# Patient Record
Sex: FEMALE | Race: WHITE | NOT HISPANIC OR LATINO | Employment: FULL TIME | URBAN - METROPOLITAN AREA
[De-identification: names, ages, dates, MRNs, and addresses within clinical notes are randomized per-mention and may not be internally consistent; named-entity substitution may affect disease eponyms.]

---

## 2018-03-30 LAB
EXTERNAL GTT 3 HOURS: NORMAL
EXTERNAL HEPATITIS B SURFACE ANTIGEN: NEGATIVE
EXTERNAL HEPATITIS C AB: NORMAL
EXTERNAL RUBELLA QUALITATIVE: NORMAL
EXTERNAL SYPHILIS RPR SCREEN: NORMAL

## 2018-08-14 ENCOUNTER — TRANSCRIBE ORDERS (OUTPATIENT)
Dept: DIABETES SERVICES | Facility: HOSPITAL | Age: 33
End: 2018-08-14

## 2018-08-14 DIAGNOSIS — O24.410 DIET CONTROLLED GESTATIONAL DIABETES MELLITUS (GDM) IN THIRD TRIMESTER: Primary | ICD-10-CM

## 2018-08-16 ENCOUNTER — HOSPITAL ENCOUNTER (OUTPATIENT)
Dept: DIABETES SERVICES | Facility: HOSPITAL | Age: 33
Discharge: HOME | End: 2018-08-16
Admitting: NURSE PRACTITIONER

## 2018-08-16 PROCEDURE — G0108 DIAB MANAGE TRN  PER INDIV: HCPCS

## 2018-09-27 LAB — EXTERNAL GROUP B STREP ANTIGEN: NORMAL

## 2018-10-11 ENCOUNTER — HOSPITAL ENCOUNTER (INPATIENT)
Facility: HOSPITAL | Age: 33
LOS: 3 days | Discharge: HOME | End: 2018-10-14
Attending: OBSTETRICS & GYNECOLOGY | Admitting: OBSTETRICS & GYNECOLOGY

## 2018-10-11 PROBLEM — O16.3 ELEVATED BLOOD PRESSURE COMPLICATING PREGNANCY IN THIRD TRIMESTER, ANTEPARTUM: Status: ACTIVE | Noted: 2018-10-11

## 2018-10-11 PROBLEM — O16.3 ELEVATED BLOOD PRESSURE AFFECTING PREGNANCY IN THIRD TRIMESTER, ANTEPARTUM: Status: ACTIVE | Noted: 2018-10-11

## 2018-10-11 PROBLEM — Z34.90 TERM PREGNANCY: Status: ACTIVE | Noted: 2018-10-11

## 2018-10-11 LAB
ABO GROUP BLD: NORMAL
ALBUMIN SERPL-MCNC: 2.9 G/DL (ref 3.5–5.2)
ALBUMIN/GLOB SERPL: 0.9 G/DL
ALP SERPL-CCNC: 142 U/L (ref 39–117)
ALT SERPL W P-5'-P-CCNC: 13 U/L (ref 1–33)
ANION GAP SERPL CALCULATED.3IONS-SCNC: 11.8 MMOL/L
AST SERPL-CCNC: 18 U/L (ref 1–32)
BASOPHILS # BLD AUTO: 0.03 10*3/MM3 (ref 0–0.2)
BASOPHILS NFR BLD AUTO: 0.3 % (ref 0–1.5)
BILIRUB SERPL-MCNC: 0.3 MG/DL (ref 0.1–1.2)
BLD GP AB SCN SERPL QL: NEGATIVE
BUN BLD-MCNC: 14 MG/DL (ref 6–20)
BUN/CREAT SERPL: 17.1 (ref 7–25)
CALCIUM SPEC-SCNC: 9.1 MG/DL (ref 8.6–10.5)
CHLORIDE SERPL-SCNC: 100 MMOL/L (ref 98–107)
CO2 SERPL-SCNC: 20.2 MMOL/L (ref 22–29)
CREAT BLD-MCNC: 0.82 MG/DL (ref 0.57–1)
DEPRECATED RDW RBC AUTO: 47.2 FL (ref 37–54)
EOSINOPHIL # BLD AUTO: 0.06 10*3/MM3 (ref 0–0.7)
EOSINOPHIL NFR BLD AUTO: 0.6 % (ref 0.3–6.2)
ERYTHROCYTE [DISTWIDTH] IN BLOOD BY AUTOMATED COUNT: 14.8 % (ref 11.7–13)
EXPIRATION DATE: ABNORMAL
GFR SERPL CREATININE-BSD FRML MDRD: 81 ML/MIN/1.73
GLOBULIN UR ELPH-MCNC: 3.3 GM/DL
GLUCOSE BLD-MCNC: 71 MG/DL (ref 65–99)
GLUCOSE BLDC GLUCOMTR-MCNC: 109 MG/DL (ref 70–130)
GLUCOSE BLDC GLUCOMTR-MCNC: 82 MG/DL (ref 70–130)
GLUCOSE BLDC GLUCOMTR-MCNC: 82 MG/DL (ref 70–130)
GLUCOSE BLDC GLUCOMTR-MCNC: 98 MG/DL (ref 70–130)
HCT VFR BLD AUTO: 36.4 % (ref 35.6–45.5)
HGB BLD-MCNC: 11 G/DL (ref 11.9–15.5)
IMM GRANULOCYTES # BLD: 0.06 10*3/MM3 (ref 0–0.03)
IMM GRANULOCYTES NFR BLD: 0.6 % (ref 0–0.5)
LYMPHOCYTES # BLD AUTO: 2.26 10*3/MM3 (ref 0.9–4.8)
LYMPHOCYTES NFR BLD AUTO: 24 % (ref 19.6–45.3)
Lab: ABNORMAL
MCH RBC QN AUTO: 26.5 PG (ref 26.9–32)
MCHC RBC AUTO-ENTMCNC: 30.2 G/DL (ref 32.4–36.3)
MCV RBC AUTO: 87.7 FL (ref 80.5–98.2)
MONOCYTES # BLD AUTO: 0.51 10*3/MM3 (ref 0.2–1.2)
MONOCYTES NFR BLD AUTO: 5.4 % (ref 5–12)
NEUTROPHILS # BLD AUTO: 6.5 10*3/MM3 (ref 1.9–8.1)
NEUTROPHILS NFR BLD AUTO: 69.1 % (ref 42.7–76)
PLATELET # BLD AUTO: 189 10*3/MM3 (ref 140–500)
PMV BLD AUTO: 12.6 FL (ref 6–12)
POTASSIUM BLD-SCNC: 4.2 MMOL/L (ref 3.5–5.2)
PROT SERPL-MCNC: 6.2 G/DL (ref 6–8.5)
PROT UR STRIP-MCNC: ABNORMAL MG/DL
RBC # BLD AUTO: 4.15 10*6/MM3 (ref 3.9–5.2)
RH BLD: POSITIVE
SODIUM BLD-SCNC: 132 MMOL/L (ref 136–145)
T&S EXPIRATION DATE: NORMAL
WBC NRBC COR # BLD: 9.42 10*3/MM3 (ref 4.5–10.7)

## 2018-10-11 PROCEDURE — 85025 COMPLETE CBC W/AUTO DIFF WBC: CPT | Performed by: OBSTETRICS & GYNECOLOGY

## 2018-10-11 PROCEDURE — 63710000001 INSULIN DETEMIR PER 5 UNITS: Performed by: OBSTETRICS & GYNECOLOGY

## 2018-10-11 PROCEDURE — 59025 FETAL NON-STRESS TEST: CPT

## 2018-10-11 PROCEDURE — 86900 BLOOD TYPING SEROLOGIC ABO: CPT | Performed by: OBSTETRICS & GYNECOLOGY

## 2018-10-11 PROCEDURE — 80053 COMPREHEN METABOLIC PANEL: CPT | Performed by: OBSTETRICS & GYNECOLOGY

## 2018-10-11 PROCEDURE — 82962 GLUCOSE BLOOD TEST: CPT

## 2018-10-11 PROCEDURE — 86850 RBC ANTIBODY SCREEN: CPT | Performed by: OBSTETRICS & GYNECOLOGY

## 2018-10-11 PROCEDURE — 86901 BLOOD TYPING SEROLOGIC RH(D): CPT | Performed by: OBSTETRICS & GYNECOLOGY

## 2018-10-11 PROCEDURE — 81002 URINALYSIS NONAUTO W/O SCOPE: CPT | Performed by: OBSTETRICS & GYNECOLOGY

## 2018-10-11 PROCEDURE — 59025 FETAL NON-STRESS TEST: CPT | Performed by: OBSTETRICS & GYNECOLOGY

## 2018-10-11 RX ORDER — SODIUM CHLORIDE 0.9 % (FLUSH) 0.9 %
3-10 SYRINGE (ML) INJECTION AS NEEDED
Status: DISCONTINUED | OUTPATIENT
Start: 2018-10-11 | End: 2018-10-12 | Stop reason: HOSPADM

## 2018-10-11 RX ORDER — ZOLPIDEM TARTRATE 5 MG/1
5 TABLET ORAL NIGHTLY PRN
Status: DISCONTINUED | OUTPATIENT
Start: 2018-10-11 | End: 2018-10-12 | Stop reason: HOSPADM

## 2018-10-11 RX ORDER — PRENATAL VIT NO.126/IRON/FOLIC 28MG-0.8MG
1 TABLET ORAL DAILY
COMMUNITY

## 2018-10-11 RX ORDER — ACETAMINOPHEN 325 MG/1
650 TABLET ORAL EVERY 4 HOURS PRN
Status: DISCONTINUED | OUTPATIENT
Start: 2018-10-11 | End: 2018-10-12 | Stop reason: HOSPADM

## 2018-10-11 RX ORDER — ACETAMINOPHEN 325 MG/1
650 TABLET ORAL ONCE
Status: COMPLETED | OUTPATIENT
Start: 2018-10-11 | End: 2018-10-11

## 2018-10-11 RX ORDER — LIDOCAINE HYDROCHLORIDE 10 MG/ML
5 INJECTION, SOLUTION EPIDURAL; INFILTRATION; INTRACAUDAL; PERINEURAL AS NEEDED
Status: DISCONTINUED | OUTPATIENT
Start: 2018-10-11 | End: 2018-10-12 | Stop reason: HOSPADM

## 2018-10-11 RX ORDER — ACETAMINOPHEN 500 MG
1000 TABLET ORAL EVERY 6 HOURS PRN
COMMUNITY

## 2018-10-11 RX ORDER — PSEUDOEPHEDRINE HCL 30 MG
60 TABLET ORAL EVERY 4 HOURS PRN
COMMUNITY

## 2018-10-11 RX ORDER — SODIUM CHLORIDE 0.9 % (FLUSH) 0.9 %
3 SYRINGE (ML) INJECTION EVERY 12 HOURS SCHEDULED
Status: DISCONTINUED | OUTPATIENT
Start: 2018-10-11 | End: 2018-10-12 | Stop reason: HOSPADM

## 2018-10-11 RX ADMIN — Medication 3 ML: at 21:22

## 2018-10-11 RX ADMIN — ACETAMINOPHEN 650 MG: 325 TABLET, FILM COATED ORAL at 23:32

## 2018-10-11 RX ADMIN — INSULIN DETEMIR 15 UNITS: 100 INJECTION, SOLUTION SUBCUTANEOUS at 21:39

## 2018-10-11 RX ADMIN — ACETAMINOPHEN 650 MG: 325 TABLET, FILM COATED ORAL at 13:47

## 2018-10-11 NOTE — PLAN OF CARE
Problem: Patient Care Overview  Goal: Plan of Care Review  Outcome: Ongoing (interventions implemented as appropriate)   10/11/18 1845   Coping/Psychosocial   Plan of Care Reviewed With patient   Plan of Care Review   Progress improving     Goal: Individualization and Mutuality  Outcome: Ongoing (interventions implemented as appropriate)   10/11/18 1845   Individualization   Patient Specific Interventions modified bedrest, vitals every 4 hours, scds       Problem: Prenatal Patient, Hospitalized (Adult,Obstetrics,Pediatric)  Goal: Signs and Symptoms of Listed Potential Problems Will be Absent, Minimized or Managed (Prenatal Patient, Hospitalized)  Outcome: Ongoing (interventions implemented as appropriate)   10/11/18 1845   Goal/Outcome Evaluation   Problems Assessed (Prenatal Patient) all   Problems Present (Prenatal Patient) none

## 2018-10-11 NOTE — NURSING NOTE
Dr. Hernandez updated on pt arrival, b/ps, and CBC. Will notify MD when CMP is resulted and urine is dipped.

## 2018-10-11 NOTE — H&P
[facility]  Obstetric History and Physical    Patient Name: Tita Mcghee  :  1985  MRN:  [id number]        Chief Complaint   Patient presents with   • Elevated Blood Pressure      at 37wks arrives from OB office d/t elevated b/ps with h/a, occasional visual changes, and neck pain. Denies N/V and epigastric pain. Pt reports +FM and denies LOF, VB, ctx. Abdomen palpates soft.    • Headache     Pt h/a for 1 week. Describes pressure behind eyes with pain score 4/10. Severity comes and goes. Worse at night.       Subjective     Patient is a 33 y.o. female  currently at 37w0d, who presents from office for evaluation of elevated BP. Seen in office for routine visit - BP mildly elevated @ 136/96 but had 4+ protein. Has had a pressure HA for past week - feels like sinus pain. Has not taken anything to relieve. No scotomata.           Her prenatal care is significant for  diabetes  GDM A2, GBS POS, marginal cord insertion. Hx PTD(12 yrs ago).        Past Medical History: Past Medical History:   Diagnosis Date   • Depression     wellbutrin-stopped taking before pregnancy   • Gestational diabetes    • Migraine     was on topamax, stopped taking with pregnancy   • Ovarian cyst     previously, before pregnancy; resolved on own      Past Surgical History History reviewed. No pertinent surgical history.   Family History: History reviewed. No pertinent family history.   Social History:  reports that she quit smoking about 18 months ago. Her smoking use included Cigarettes. She has a 5.00 pack-year smoking history. She has never used smokeless tobacco.   reports that she does not drink alcohol.   reports that she does not use drugs.    Allergies:     Erythromycin     Past OB History:       Obstetric History       T1      L2     SAB0   TAB0   Ectopic0   Molar0   Multiple0   Live Births2       # Outcome Date GA Lbr Lloyd/2nd Weight Sex Delivery Anes PTL Lv   3 Current            2   04/25/06 35w0d  3629 g (8 lb) F Vag-Spont Spinal  [facility]   1 Term 09/05/04 40w0d  3685 g (8 lb 2 oz) F Vag-Spont EPI  [facility]            Objective       Vital Signs Range for the last 24 hours  Temperature: Temp:  [98.1 °F (36.7 °C)] 98.1 °F (36.7 °C)   Temp Source: Temp src: Oral   BP: BP: (117-160)/(71-98) 130/77   Pulse: Heart Rate:  [77-98] 90   Respirations: Resp:  [16-18] 18             Physical Exam:        General :   Alert and cooperative in NAD   Lungs:   Clear to auscultation bilaterally   CV:   Regular rate and rhythm   Abdomen:  Gravid, non-tender, no masses, no RUQ or epigastric tenderness   Vaginal exam: 1-2/50/-2     LE:   1+ edema, 2+ DTRs      FHR:   120's moderate variability - cat I   [place]:   Irregular contractions          Results from last 7 days  Lab Units 10/11/18  1150   WBC 10*3/mm3 9.42   HEMOGLOBIN g/dL 11.0*   HEMATOCRIT % 36.4   PLATELETS 10*3/mm3 189       Results from last 7 days  Lab Units 10/11/18  1150   SODIUM mmol/L 132*   POTASSIUM mmol/L 4.2   CHLORIDE mmol/L 100   CO2 mmol/L 20.2*   BUN mg/dL 14   CREATININE mg/dL 0.82   CALCIUM mg/dL 9.1   BILIRUBIN mg/dL 0.3   ALK PHOS U/L 142*   ALT (SGPT) U/L 13   AST (SGOT) U/L 18   GLUCOSE mg/dL 71     Urine dip 2+.         A/P:  1. 37 0/7 with elevated BP and proteinuria. BP much better here on unit on bedrest - no severe range BP noted. Minimal symptom - HA improved with tylenol and all labs normal. Recommend proceed with 24 urine total protein and if has preeclamptic range proteinuria then proceed with delivery. If any signs of severe disease - worsening HA or visual changes then also proceed with delivery.     2. GDMA2 - BS have been well controlled.         Sushil Hernnadez MD  10/11/2018  4:46 PM

## 2018-10-12 ENCOUNTER — ANESTHESIA EVENT (OUTPATIENT)
Dept: LABOR AND DELIVERY | Facility: HOSPITAL | Age: 33
End: 2018-10-12

## 2018-10-12 ENCOUNTER — ANESTHESIA (OUTPATIENT)
Dept: LABOR AND DELIVERY | Facility: HOSPITAL | Age: 33
End: 2018-10-12

## 2018-10-12 PROBLEM — Z34.90 TERM PREGNANCY: Status: RESOLVED | Noted: 2018-10-11 | Resolved: 2018-10-12

## 2018-10-12 LAB
ATMOSPHERIC PRESS: 746.7 MMHG
BASE EXCESS BLDCOV CALC-SCNC: -5.9 MMOL/L (ref -30–30)
BDY SITE: ABNORMAL
GAS FLOW AIRWAY: 10 LPM
GLUCOSE BLDC GLUCOMTR-MCNC: 67 MG/DL (ref 70–130)
GLUCOSE BLDC GLUCOMTR-MCNC: 69 MG/DL (ref 70–130)
GLUCOSE BLDC GLUCOMTR-MCNC: 82 MG/DL (ref 70–130)
HCO3 BLDCOV-SCNC: 20.2 MMOL/L
MODALITY: ABNORMAL
PCO2 BLDCOV: 40.9 MM HG (ref 35–51.3)
PH BLDCOV: 7.3 PH UNITS (ref 7.26–7.4)
PO2 BLDCOV: 34.6 MM HG (ref 19–39)
SAO2 % BLDCOA: 60.5 % (ref 92–99)
SAO2 % BLDCOV: ABNORMAL %

## 2018-10-12 PROCEDURE — 82803 BLOOD GASES ANY COMBINATION: CPT

## 2018-10-12 PROCEDURE — 82962 GLUCOSE BLOOD TEST: CPT

## 2018-10-12 PROCEDURE — 0KQM0ZZ REPAIR PERINEUM MUSCLE, OPEN APPROACH: ICD-10-PCS | Performed by: OBSTETRICS & GYNECOLOGY

## 2018-10-12 PROCEDURE — 25010000002 PENICILLIN G POTASSIUM PER 600000 UNITS: Performed by: OBSTETRICS & GYNECOLOGY

## 2018-10-12 PROCEDURE — 59025 FETAL NON-STRESS TEST: CPT

## 2018-10-12 PROCEDURE — C1755 CATHETER, INTRASPINAL: HCPCS | Performed by: ANESTHESIOLOGY

## 2018-10-12 PROCEDURE — 3E033VJ INTRODUCTION OF OTHER HORMONE INTO PERIPHERAL VEIN, PERCUTANEOUS APPROACH: ICD-10-PCS | Performed by: OBSTETRICS & GYNECOLOGY

## 2018-10-12 RX ORDER — ONDANSETRON 2 MG/ML
4 INJECTION INTRAMUSCULAR; INTRAVENOUS ONCE AS NEEDED
Status: DISCONTINUED | OUTPATIENT
Start: 2018-10-12 | End: 2018-10-12 | Stop reason: HOSPADM

## 2018-10-12 RX ORDER — PROMETHAZINE HYDROCHLORIDE 25 MG/1
25 TABLET ORAL EVERY 6 HOURS PRN
Status: DISCONTINUED | OUTPATIENT
Start: 2018-10-12 | End: 2018-10-14 | Stop reason: HOSPADM

## 2018-10-12 RX ORDER — OXYCODONE HYDROCHLORIDE AND ACETAMINOPHEN 5; 325 MG/1; MG/1
2 TABLET ORAL EVERY 4 HOURS PRN
Status: DISCONTINUED | OUTPATIENT
Start: 2018-10-12 | End: 2018-10-14 | Stop reason: HOSPADM

## 2018-10-12 RX ORDER — ACETAMINOPHEN 325 MG/1
650 TABLET ORAL EVERY 4 HOURS PRN
Status: DISCONTINUED | OUTPATIENT
Start: 2018-10-12 | End: 2018-10-14 | Stop reason: HOSPADM

## 2018-10-12 RX ORDER — OXYCODONE HYDROCHLORIDE AND ACETAMINOPHEN 5; 325 MG/1; MG/1
1 TABLET ORAL EVERY 4 HOURS PRN
Status: DISCONTINUED | OUTPATIENT
Start: 2018-10-12 | End: 2018-10-14 | Stop reason: HOSPADM

## 2018-10-12 RX ORDER — ONDANSETRON 4 MG/1
4 TABLET, FILM COATED ORAL EVERY 6 HOURS PRN
Status: DISCONTINUED | OUTPATIENT
Start: 2018-10-12 | End: 2018-10-12 | Stop reason: HOSPADM

## 2018-10-12 RX ORDER — METHYLERGONOVINE MALEATE 0.2 MG/ML
200 INJECTION INTRAVENOUS ONCE AS NEEDED
Status: DISCONTINUED | OUTPATIENT
Start: 2018-10-12 | End: 2018-10-12 | Stop reason: HOSPADM

## 2018-10-12 RX ORDER — OXYTOCIN-SODIUM CHLORIDE 0.9% IV SOLN 30 UNIT/500ML 30-0.9/5 UT/ML-%
125 SOLUTION INTRAVENOUS CONTINUOUS PRN
Status: COMPLETED | OUTPATIENT
Start: 2018-10-12 | End: 2018-10-12

## 2018-10-12 RX ORDER — ONDANSETRON 4 MG/1
4 TABLET, ORALLY DISINTEGRATING ORAL EVERY 6 HOURS PRN
Status: DISCONTINUED | OUTPATIENT
Start: 2018-10-12 | End: 2018-10-12 | Stop reason: HOSPADM

## 2018-10-12 RX ORDER — MISOPROSTOL 200 UG/1
800 TABLET ORAL AS NEEDED
Status: DISCONTINUED | OUTPATIENT
Start: 2018-10-12 | End: 2018-10-12 | Stop reason: HOSPADM

## 2018-10-12 RX ORDER — PENICILLIN G 3000000 [IU]/50ML
3 INJECTION, SOLUTION INTRAVENOUS EVERY 4 HOURS
Status: DISCONTINUED | OUTPATIENT
Start: 2018-10-12 | End: 2018-10-12 | Stop reason: HOSPADM

## 2018-10-12 RX ORDER — PHYTONADIONE 1 MG/.5ML
INJECTION, EMULSION INTRAMUSCULAR; INTRAVENOUS; SUBCUTANEOUS
Status: DISPENSED
Start: 2018-10-12 | End: 2018-10-13

## 2018-10-12 RX ORDER — CARBOPROST TROMETHAMINE 250 UG/ML
250 INJECTION, SOLUTION INTRAMUSCULAR AS NEEDED
Status: DISCONTINUED | OUTPATIENT
Start: 2018-10-12 | End: 2018-10-12 | Stop reason: HOSPADM

## 2018-10-12 RX ORDER — MISOPROSTOL 200 UG/1
600 TABLET ORAL ONCE AS NEEDED
Status: DISCONTINUED | OUTPATIENT
Start: 2018-10-12 | End: 2018-10-14 | Stop reason: HOSPADM

## 2018-10-12 RX ORDER — DOCUSATE SODIUM 100 MG/1
100 CAPSULE, LIQUID FILLED ORAL 2 TIMES DAILY
Status: DISCONTINUED | OUTPATIENT
Start: 2018-10-12 | End: 2018-10-14 | Stop reason: HOSPADM

## 2018-10-12 RX ORDER — LIDOCAINE HYDROCHLORIDE 15 MG/ML
INJECTION, SOLUTION EPIDURAL; INFILTRATION; INTRACAUDAL; PERINEURAL AS NEEDED
Status: DISCONTINUED | OUTPATIENT
Start: 2018-10-12 | End: 2018-10-12 | Stop reason: SURG

## 2018-10-12 RX ORDER — ONDANSETRON 2 MG/ML
4 INJECTION INTRAMUSCULAR; INTRAVENOUS EVERY 6 HOURS PRN
Status: DISCONTINUED | OUTPATIENT
Start: 2018-10-12 | End: 2018-10-12 | Stop reason: HOSPADM

## 2018-10-12 RX ORDER — FAMOTIDINE 10 MG/ML
20 INJECTION, SOLUTION INTRAVENOUS ONCE AS NEEDED
Status: DISCONTINUED | OUTPATIENT
Start: 2018-10-12 | End: 2018-10-12 | Stop reason: HOSPADM

## 2018-10-12 RX ORDER — EPHEDRINE SULFATE 50 MG/ML
5 INJECTION, SOLUTION INTRAVENOUS AS NEEDED
Status: DISCONTINUED | OUTPATIENT
Start: 2018-10-12 | End: 2018-10-12 | Stop reason: HOSPADM

## 2018-10-12 RX ORDER — LIDOCAINE HYDROCHLORIDE AND EPINEPHRINE 15; 5 MG/ML; UG/ML
INJECTION, SOLUTION EPIDURAL AS NEEDED
Status: DISCONTINUED | OUTPATIENT
Start: 2018-10-12 | End: 2018-10-12 | Stop reason: SURG

## 2018-10-12 RX ORDER — SODIUM CHLORIDE, SODIUM LACTATE, POTASSIUM CHLORIDE, CALCIUM CHLORIDE 600; 310; 30; 20 MG/100ML; MG/100ML; MG/100ML; MG/100ML
125 INJECTION, SOLUTION INTRAVENOUS CONTINUOUS
Status: DISCONTINUED | OUTPATIENT
Start: 2018-10-12 | End: 2018-10-13

## 2018-10-12 RX ORDER — LABETALOL HYDROCHLORIDE 5 MG/ML
10 INJECTION, SOLUTION INTRAVENOUS ONCE
Status: DISCONTINUED | OUTPATIENT
Start: 2018-10-12 | End: 2018-10-12

## 2018-10-12 RX ORDER — PROMETHAZINE HYDROCHLORIDE 12.5 MG/1
12.5 SUPPOSITORY RECTAL EVERY 6 HOURS PRN
Status: DISCONTINUED | OUTPATIENT
Start: 2018-10-12 | End: 2018-10-14 | Stop reason: HOSPADM

## 2018-10-12 RX ORDER — DIPHENHYDRAMINE HYDROCHLORIDE 50 MG/ML
12.5 INJECTION INTRAMUSCULAR; INTRAVENOUS EVERY 8 HOURS PRN
Status: DISCONTINUED | OUTPATIENT
Start: 2018-10-12 | End: 2018-10-12 | Stop reason: HOSPADM

## 2018-10-12 RX ORDER — NIFEDIPINE 30 MG/1
30 TABLET, EXTENDED RELEASE ORAL
Status: DISCONTINUED | OUTPATIENT
Start: 2018-10-12 | End: 2018-10-14 | Stop reason: HOSPADM

## 2018-10-12 RX ORDER — OXYTOCIN-SODIUM CHLORIDE 0.9% IV SOLN 30 UNIT/500ML 30-0.9/5 UT/ML-%
2-30 SOLUTION INTRAVENOUS
Status: DISCONTINUED | OUTPATIENT
Start: 2018-10-12 | End: 2018-10-12 | Stop reason: HOSPADM

## 2018-10-12 RX ORDER — PROMETHAZINE HYDROCHLORIDE 25 MG/ML
12.5 INJECTION, SOLUTION INTRAMUSCULAR; INTRAVENOUS EVERY 6 HOURS PRN
Status: DISCONTINUED | OUTPATIENT
Start: 2018-10-12 | End: 2018-10-14 | Stop reason: HOSPADM

## 2018-10-12 RX ORDER — OXYTOCIN-SODIUM CHLORIDE 0.9% IV SOLN 30 UNIT/500ML 30-0.9/5 UT/ML-%
250 SOLUTION INTRAVENOUS CONTINUOUS
Status: ACTIVE | OUTPATIENT
Start: 2018-10-12 | End: 2018-10-12

## 2018-10-12 RX ORDER — ONDANSETRON 4 MG/1
4 TABLET, ORALLY DISINTEGRATING ORAL EVERY 6 HOURS PRN
Status: DISCONTINUED | OUTPATIENT
Start: 2018-10-12 | End: 2018-10-14 | Stop reason: HOSPADM

## 2018-10-12 RX ORDER — PROMETHAZINE HYDROCHLORIDE 25 MG/ML
12.5 INJECTION, SOLUTION INTRAMUSCULAR; INTRAVENOUS EVERY 6 HOURS PRN
Status: DISCONTINUED | OUTPATIENT
Start: 2018-10-12 | End: 2018-10-12 | Stop reason: HOSPADM

## 2018-10-12 RX ORDER — OXYTOCIN-SODIUM CHLORIDE 0.9% IV SOLN 30 UNIT/500ML 30-0.9/5 UT/ML-%
999 SOLUTION INTRAVENOUS ONCE
Status: DISCONTINUED | OUTPATIENT
Start: 2018-10-12 | End: 2018-10-12 | Stop reason: HOSPADM

## 2018-10-12 RX ORDER — IBUPROFEN 800 MG/1
800 TABLET ORAL EVERY 8 HOURS PRN
Status: DISCONTINUED | OUTPATIENT
Start: 2018-10-12 | End: 2018-10-14 | Stop reason: HOSPADM

## 2018-10-12 RX ORDER — PROMETHAZINE HYDROCHLORIDE 12.5 MG/1
12.5 SUPPOSITORY RECTAL EVERY 6 HOURS PRN
Status: DISCONTINUED | OUTPATIENT
Start: 2018-10-12 | End: 2018-10-12 | Stop reason: HOSPADM

## 2018-10-12 RX ORDER — ONDANSETRON 2 MG/ML
4 INJECTION INTRAMUSCULAR; INTRAVENOUS EVERY 6 HOURS PRN
Status: DISCONTINUED | OUTPATIENT
Start: 2018-10-12 | End: 2018-10-14 | Stop reason: HOSPADM

## 2018-10-12 RX ORDER — PROMETHAZINE HYDROCHLORIDE 25 MG/1
12.5 TABLET ORAL EVERY 6 HOURS PRN
Status: DISCONTINUED | OUTPATIENT
Start: 2018-10-12 | End: 2018-10-12 | Stop reason: HOSPADM

## 2018-10-12 RX ORDER — ONDANSETRON 4 MG/1
4 TABLET, FILM COATED ORAL EVERY 6 HOURS PRN
Status: DISCONTINUED | OUTPATIENT
Start: 2018-10-12 | End: 2018-10-14 | Stop reason: HOSPADM

## 2018-10-12 RX ORDER — ERYTHROMYCIN 5 MG/G
OINTMENT OPHTHALMIC
Status: DISPENSED
Start: 2018-10-12 | End: 2018-10-13

## 2018-10-12 RX ADMIN — ACETAMINOPHEN 650 MG: 325 TABLET, FILM COATED ORAL at 14:13

## 2018-10-12 RX ADMIN — DOCUSATE SODIUM 100 MG: 100 CAPSULE, LIQUID FILLED ORAL at 21:41

## 2018-10-12 RX ADMIN — EPHEDRINE SULFATE 5 MG: 50 INJECTION, SOLUTION INTRAVENOUS at 16:32

## 2018-10-12 RX ADMIN — NIFEDIPINE 30 MG: 30 TABLET, FILM COATED, EXTENDED RELEASE ORAL at 23:31

## 2018-10-12 RX ADMIN — Medication: at 21:41

## 2018-10-12 RX ADMIN — SODIUM CHLORIDE, POTASSIUM CHLORIDE, SODIUM LACTATE AND CALCIUM CHLORIDE 125 ML/HR: 600; 310; 30; 20 INJECTION, SOLUTION INTRAVENOUS at 11:15

## 2018-10-12 RX ADMIN — OXYTOCIN 2 MILLI-UNITS/MIN: 10 INJECTION, SOLUTION INTRAMUSCULAR; INTRAVENOUS at 11:30

## 2018-10-12 RX ADMIN — OXYCODONE HYDROCHLORIDE AND ACETAMINOPHEN 1 TABLET: 5; 325 TABLET ORAL at 22:50

## 2018-10-12 RX ADMIN — IBUPROFEN 800 MG: 800 TABLET ORAL at 21:41

## 2018-10-12 RX ADMIN — LIDOCAINE HYDROCHLORIDE 3 ML: 15 INJECTION, SOLUTION EPIDURAL; INFILTRATION; INTRACAUDAL; PERINEURAL at 15:26

## 2018-10-12 RX ADMIN — LIDOCAINE HYDROCHLORIDE AND EPINEPHRINE 3 ML: 15; 5 INJECTION, SOLUTION EPIDURAL at 15:22

## 2018-10-12 RX ADMIN — Medication 10 ML/HR: at 15:37

## 2018-10-12 RX ADMIN — OXYTOCIN 125 ML/HR: 10 INJECTION, SOLUTION INTRAMUSCULAR; INTRAVENOUS at 20:18

## 2018-10-12 RX ADMIN — LIDOCAINE HYDROCHLORIDE 3 ML: 15 INJECTION, SOLUTION EPIDURAL; INFILTRATION; INTRACAUDAL; PERINEURAL at 15:30

## 2018-10-12 RX ADMIN — PENICILLIN G 3 MILLION UNITS: 3000000 INJECTION, SOLUTION INTRAVENOUS at 15:49

## 2018-10-12 RX ADMIN — SODIUM CHLORIDE 5 MILLION UNITS: 900 INJECTION INTRAVENOUS at 11:26

## 2018-10-12 RX ADMIN — LIDOCAINE HYDROCHLORIDE 3 ML: 15 INJECTION, SOLUTION EPIDURAL; INFILTRATION; INTRACAUDAL; PERINEURAL at 15:34

## 2018-10-12 RX ADMIN — SODIUM CHLORIDE, POTASSIUM CHLORIDE, SODIUM LACTATE AND CALCIUM CHLORIDE 125 ML/HR: 600; 310; 30; 20 INJECTION, SOLUTION INTRAVENOUS at 15:39

## 2018-10-12 NOTE — PLAN OF CARE
Problem: Patient Care Overview  Goal: Plan of Care Review  Outcome: Ongoing (interventions implemented as appropriate)   10/12/18 1351   Coping/Psychosocial   Plan of Care Reviewed With patient;spouse   Plan of Care Review   Progress improving   OTHER   Outcome Summary Pt tolerating labor well at this time     Goal: Individualization and Mutuality  Outcome: Ongoing (interventions implemented as appropriate)   10/12/18 1351   Individualization   Patient Specific Preferences epidural in labor, kangaroo care   Patient Specific Goals (Include Timeframe) vaginal delivery, pain control    Patient Specific Interventions assist with position changes, call for epidural     Goal: Discharge Needs Assessment  Outcome: Ongoing (interventions implemented as appropriate)   10/12/18 1351   Discharge Needs Assessment   Readmission Within the Last 30 Days no previous admission in last 30 days   Concerns to be Addressed no discharge needs identified   Patient/Family Anticipates Transition to home   Patient/Family Anticipated Services at Transition none   Transportation Concerns car, none   Transportation Anticipated agency   Anticipated Changes Related to Illness none   Equipment Needed After Discharge none   Disability   Equipment Currently Used at Home none       Problem: Prenatal Patient, Hospitalized (Adult,Obstetrics,Pediatric)  Goal: Signs and Symptoms of Listed Potential Problems Will be Absent, Minimized or Managed (Prenatal Patient, Hospitalized)  Outcome: Ongoing (interventions implemented as appropriate)      Problem: Hypertensive Disorders in Pregnancy (Adult,Obstetrics,Pediatric)  Goal: Signs and Symptoms of Listed Potential Problems Will be Absent, Minimized or Managed (Hypertensive Disorders in Pregnancy)  Outcome: Ongoing (interventions implemented as appropriate)   10/12/18 1351   Goal/Outcome Evaluation   Problems Assessed (Hypertensive Disorders in Pregnancy) all   Problems Present (Hypertensive/in PG) none

## 2018-10-12 NOTE — NON STRESS TEST
Tita Mcghee, a  at 37w1d with an NAOMI of 2018, by Patient Reported, was seen at [facility] LABOR DELIVERY for a nonstress test.    Chief Complaint   Patient presents with   • Elevated Blood Pressure      at 37wks arrives from OB office d/t elevated b/ps with h/a, occasional visual changes, and neck pain. Denies N/V and epigastric pain. Pt reports +FM and denies LOF, VB, ctx. Abdomen palpates soft.    • Headache     Pt h/a for 1 week. Describes pressure behind eyes with pain score 4/10. Severity comes and goes. Worse at night.       Patient Active Problem List   Diagnosis   • Term pregnancy   • Elevated blood pressure affecting pregnancy in third trimester, antepartum   • Elevated blood pressure complicating pregnancy in third trimester, antepartum       Start Time: 843  Stop Time: 943    Interpretation A  Nonstress Test Interpretation A: Reactive (10/12/18 0943 : Leanna Cuevas RN)

## 2018-10-12 NOTE — PLAN OF CARE
Problem: Patient Care Overview  Goal: Plan of Care Review  Outcome: Ongoing (interventions implemented as appropriate)   10/12/18 0610   Coping/Psychosocial   Plan of Care Reviewed With patient;spouse   Plan of Care Review   Progress improving   OTHER   Outcome Summary RNST, pt. denies any vaginal bleeding, LOF, or contractions, labile b/p's, insulin for GDM     Goal: Individualization and Mutuality  Outcome: Ongoing (interventions implemented as appropriate)   10/12/18 0610   Individualization   Patient Specific Interventions vitals every 4 hours, insulin to regulate blood sugar, 24 hour urine in progress     Goal: Interprofessional Rounds/Family Conf  Outcome: Ongoing (interventions implemented as appropriate)   10/12/18 0610   Interdisciplinary Rounds/Family Conf   Participants nursing;patient;physician       Problem: Prenatal Patient, Hospitalized (Adult,Obstetrics,Pediatric)  Goal: Signs and Symptoms of Listed Potential Problems Will be Absent, Minimized or Managed (Prenatal Patient, Hospitalized)  Outcome: Ongoing (interventions implemented as appropriate)   10/12/18 0610   Goal/Outcome Evaluation   Problems Assessed (Prenatal Patient) all   Problems Present (Prenatal Patient) none       Problem: Hypertensive Disorders in Pregnancy (Adult,Obstetrics,Pediatric)  Goal: Signs and Symptoms of Listed Potential Problems Will be Absent, Minimized or Managed (Hypertensive Disorders in Pregnancy)  Outcome: Ongoing (interventions implemented as appropriate)   10/12/18 0610   Goal/Outcome Evaluation   Problems Assessed (Hypertensive Disorders in Pregnancy) all   Problems Present (Hypertensive/in PG) none

## 2018-10-12 NOTE — NURSING NOTE
At bedside to hang amnioinfusion due to recurrent variable decels. Amnioinfusion not runing, so IUPC flushed with 10 ml saline flush and then another 10 ml saline flush. Fluid running slowly to gravity after that.

## 2018-10-12 NOTE — ANESTHESIA PREPROCEDURE EVALUATION
Anesthesia Evaluation     Patient summary reviewed and Nursing notes reviewed                Airway   Mallampati: II  TM distance: >3 FB  Neck ROM: full  no difficulty expected  Dental - normal exam     Pulmonary - negative pulmonary ROS and normal exam   Cardiovascular - negative cardio ROS and normal exam        Neuro/Psych- negative ROS  GI/Hepatic/Renal/Endo    (+)   diabetes mellitus,     Musculoskeletal (-) negative ROS    Abdominal  - normal exam   Substance History - negative use     OB/GYN negative ob/gyn ROS         Other                        Anesthesia Plan    ASA 2     epidural     intravenous induction   Anesthetic plan, all risks, benefits, and alternatives have been provided, discussed and informed consent has been obtained with: patient.    Plan discussed with CRNA.

## 2018-10-12 NOTE — PROGRESS NOTES
[facility]  Obstetric Progress Note    Patient Name: Tita Mcghee  :  1985  MRN:  [id number]  Hospital Day: 1  EGA: 37w1d      Subjective     Called due to increasing BP this AM.  Pt actually woke suddenly during night with HTN and had BP get more normal after that.  Verbal this morning with /120, 160/111, 166/107.    Objective     VS:  Vital Signs Range for the last 24 hours  Temperature: Temp:  [97.4 °F (36.3 °C)-98.3 °F (36.8 °C)] 97.4 °F (36.3 °C)   Temp Source: Temp src: Oral   BP: BP: (117-160)/() 128/82   Pulse: Heart Rate:  [] 92   Respirations: Resp:  [16-18] 18                   Physical Examination:     General :  Alert in NAD  Abdomen: Gravid, Fundus - soft       Lab results reviewed:  CBC:   Lab Results   Component Value Date    WBC 9.42 10/11/2018    HGB 11.0 (L) 10/11/2018    HCT 36.4 10/11/2018            A/P:      Elevated blood pressure affecting pregnancy in third trimester, antepartum    Term pregnancy    Elevated blood pressure complicating pregnancy in third trimester, antepartum    Will proceed with induction today due to increase in BP.  Pt understands and agrees.          Ruthie Dowling MD  10/12/2018  9:39 AM

## 2018-10-12 NOTE — NON STRESS TEST
Tita Mcghee, a  at 37w0d with an NAOMI of 2018, by Patient Reported, was seen at [facility] ANTEPARTUM UNIT for a nonstress test.    Chief Complaint   Patient presents with   • Elevated Blood Pressure      at 37wks arrives from OB office d/t elevated b/ps with h/a, occasional visual changes, and neck pain. Denies N/V and epigastric pain. Pt reports +FM and denies LOF, VB, ctx. Abdomen palpates soft.    • Headache     Pt h/a for 1 week. Describes pressure behind eyes with pain score 4/10. Severity comes and goes. Worse at night.       Patient Active Problem List   Diagnosis   • Term pregnancy   • Elevated blood pressure affecting pregnancy in third trimester, antepartum   • Elevated blood pressure complicating pregnancy in third trimester, antepartum       Start Time:   Stop Time:      reactive

## 2018-10-12 NOTE — L&D DELIVERY NOTE
"[facility]  Vaginal Delivery Note    Patient Name: Tita Mcghee  :  1985  MRN:  [id number]      Delivery     Delivery: Vaginal, Spontaneous Delivery     YOB: 2016    Time of Birth: 6:52 PM      Anesthesia: Epidural     Delivering clinician: Ruthie Dowling MD       Infant    Findings: Viable female  infant    Infant observations: Weight: 3380 g (7 lb 7.2 oz)   Observations/Comments:  Scale 3      Apgars: 7   @ 1 minute /    9   @ 5 minutes     Placenta, Cord, and Fluid    Placenta delivered  Expressed   at  10/12/2018  6:55 PM     Cord: 3 vessels  present.   Cord blood obtained: Yes    Cord gases obtained:  Yes      Repair    Episiotomy: No   Lacerations: Second midline   Estimated Blood Loss: 250  mls.       Delivery narrative: The patient is a 33 y.o.  at 37w1d.  Presented from office for evaluation of hypertension.  Initially blood pressure was better, but over the night they increased, so we opted for induction.  Was given pitocin and epidural. Progressed normally to C/C/+1.  Fetal status reassuring throughout. There were some early decels as she became complete.  of viable female infant  @ 6:52 PM  over an intact perineum. \"Turtle sign\" was encountered when head delivered.  Pt was delivered with exaggerated McRobert's maneuver, head lowered and right shoulder rotated anteriorly. 3380 g (7 lb 7.2 oz) .  Placenta delivered manually due to cord breaking, intact with 3 vessel cord. Cervix and rectum intact. Second degree laceration repaired in usual fashion with 3-0 monocryl suture. Mother and baby recovering good condition.       Ruthie Dowling MD  10/12/18  7:26 PM      "

## 2018-10-12 NOTE — ANESTHESIA PROCEDURE NOTES
Labor Epidural    Pre-sedation assessment completed: 10/12/2018 3:07 PM    Patient reassessed immediately prior to procedure    Patient location during procedure: OB  Start Time: 10/12/2018 3:07 PM  Stop Time: 10/12/2018 3:39 PM  Indication:at surgeon's request  Performed By  Anesthesiologist: VIJAY WOMACK  Preanesthetic Checklist  Completed: patient identified, site marked, surgical consent, pre-op evaluation, timeout performed, IV checked, risks and benefits discussed and monitors and equipment checked  Prep:  Pt Position:sitting  Sterile Tech:cap, gloves, gown, mask and sterile barrier  Prep:chlorhexidine gluconate and isopropyl alcohol  Monitoring:blood pressure monitoring and EKG  Epidural Block Procedure:  Approach:midline  Guidance:landmark technique and palpation technique  Location:L4-L5  Needle Type:Tuohy  Needle Gauge:17  Aspiration:negative  Test Dose:negative  Post Assessment:  Dressing:occlusive dressing applied and secured with tape  Pt Tolerance:patient tolerated the procedure well with no apparent complications  Complications:no

## 2018-10-13 LAB
BASOPHILS # BLD AUTO: 0.03 10*3/MM3 (ref 0–0.2)
BASOPHILS NFR BLD AUTO: 0.3 % (ref 0–1.5)
DEPRECATED RDW RBC AUTO: 47.6 FL (ref 37–54)
EOSINOPHIL # BLD AUTO: 0.1 10*3/MM3 (ref 0–0.7)
EOSINOPHIL NFR BLD AUTO: 0.9 % (ref 0.3–6.2)
ERYTHROCYTE [DISTWIDTH] IN BLOOD BY AUTOMATED COUNT: 14.9 % (ref 11.7–13)
GLUCOSE BLDC GLUCOMTR-MCNC: 75 MG/DL (ref 70–130)
GLUCOSE BLDC GLUCOMTR-MCNC: 89 MG/DL (ref 70–130)
HCT VFR BLD AUTO: 36.5 % (ref 35.6–45.5)
HGB BLD-MCNC: 11.4 G/DL (ref 11.9–15.5)
IMM GRANULOCYTES # BLD: 0.05 10*3/MM3 (ref 0–0.03)
IMM GRANULOCYTES NFR BLD: 0.4 % (ref 0–0.5)
LYMPHOCYTES # BLD AUTO: 2.55 10*3/MM3 (ref 0.9–4.8)
LYMPHOCYTES NFR BLD AUTO: 22.5 % (ref 19.6–45.3)
MCH RBC QN AUTO: 27.3 PG (ref 26.9–32)
MCHC RBC AUTO-ENTMCNC: 31.2 G/DL (ref 32.4–36.3)
MCV RBC AUTO: 87.5 FL (ref 80.5–98.2)
MONOCYTES # BLD AUTO: 0.79 10*3/MM3 (ref 0.2–1.2)
MONOCYTES NFR BLD AUTO: 7 % (ref 5–12)
NEUTROPHILS # BLD AUTO: 7.81 10*3/MM3 (ref 1.9–8.1)
NEUTROPHILS NFR BLD AUTO: 68.9 % (ref 42.7–76)
PLATELET # BLD AUTO: 164 10*3/MM3 (ref 140–500)
PMV BLD AUTO: 12.7 FL (ref 6–12)
RBC # BLD AUTO: 4.17 10*6/MM3 (ref 3.9–5.2)
WBC NRBC COR # BLD: 11.33 10*3/MM3 (ref 4.5–10.7)

## 2018-10-13 PROCEDURE — 85025 COMPLETE CBC W/AUTO DIFF WBC: CPT | Performed by: OBSTETRICS & GYNECOLOGY

## 2018-10-13 PROCEDURE — 82962 GLUCOSE BLOOD TEST: CPT

## 2018-10-13 RX ADMIN — OXYCODONE HYDROCHLORIDE AND ACETAMINOPHEN 1 TABLET: 5; 325 TABLET ORAL at 17:30

## 2018-10-13 RX ADMIN — NIFEDIPINE 30 MG: 30 TABLET, FILM COATED, EXTENDED RELEASE ORAL at 23:53

## 2018-10-13 RX ADMIN — IBUPROFEN 800 MG: 800 TABLET ORAL at 05:49

## 2018-10-13 RX ADMIN — OXYCODONE HYDROCHLORIDE AND ACETAMINOPHEN 1 TABLET: 5; 325 TABLET ORAL at 08:48

## 2018-10-13 RX ADMIN — OXYCODONE HYDROCHLORIDE AND ACETAMINOPHEN 1 TABLET: 5; 325 TABLET ORAL at 21:46

## 2018-10-13 RX ADMIN — IBUPROFEN 800 MG: 800 TABLET ORAL at 14:13

## 2018-10-13 RX ADMIN — DOCUSATE SODIUM 100 MG: 100 CAPSULE, LIQUID FILLED ORAL at 21:07

## 2018-10-13 RX ADMIN — DOCUSATE SODIUM 100 MG: 100 CAPSULE, LIQUID FILLED ORAL at 08:48

## 2018-10-13 RX ADMIN — IBUPROFEN 800 MG: 800 TABLET ORAL at 21:46

## 2018-10-13 NOTE — PLAN OF CARE
Problem: Patient Care Overview  Goal: Plan of Care Review  Outcome: Ongoing (interventions implemented as appropriate)   10/12/18 2006   Coping/Psychosocial   Plan of Care Reviewed With patient;spouse   Plan of Care Review   Progress improving   OTHER   Outcome Summary Pt delivered in labor and delivery and recovered. Transfer of care to post partum     Goal: Individualization and Mutuality   10/12/18 1351   Individualization   Patient Specific Preferences epidural in labor, kangaroo care   Patient Specific Goals (Include Timeframe) vaginal delivery, pain control    Patient Specific Interventions assist with position changes, call for epidural     Goal: Discharge Needs Assessment  Outcome: Ongoing (interventions implemented as appropriate)   10/12/18 2006   Discharge Needs Assessment   Readmission Within the Last 30 Days no previous admission in last 30 days   Concerns to be Addressed no discharge needs identified   Patient/Family Anticipates Transition to home   Patient/Family Anticipated Services at Transition none     Goal: Interprofessional Rounds/Family Conf  Outcome: Ongoing (interventions implemented as appropriate)   10/12/18 2006   Interdisciplinary Rounds/Family Conf   Participants patient;nursing;family;physician       Problem: Hypertensive Disorders in Pregnancy (Adult,Obstetrics,Pediatric)  Goal: Signs and Symptoms of Listed Potential Problems Will be Absent, Minimized or Managed (Hypertensive Disorders in Pregnancy)  Outcome: Ongoing (interventions implemented as appropriate)   10/12/18 2006   Goal/Outcome Evaluation   Problems Assessed (Hypertensive Disorders in Pregnancy) all   Problems Present (Hypertensive/in PG) none       Problem: Fall Risk,  (Adult,Obstetrics,Pediatric)  Goal: Identify Related Risk Factors and Signs and Symptoms  Outcome: Ongoing (interventions implemented as appropriate)   10/12/18 2006   Fall Risk,  (Adult,Obstetrics,Pediatric)   Related Risk Factors (Fall  Risk, ) regional anesthesia;prolonged bed rest   Signs and Symptoms (Fall Risk, ) presence of fall risk factors     Goal: Absence of Maternal Fall  Outcome: Ongoing (interventions implemented as appropriate)   10/12/18 2006   Fall Risk,  (Adult,Obstetrics,Pediatric)   Absence of Maternal Fall achieves outcome     Goal: Absence of [place] Fall/Drop  Outcome: Ongoing (interventions implemented as appropriate)   10/12/18 2006   Fall Risk,  (Adult,Obstetrics,Pediatric)   Absence of  Fall/Drop achieves outcome       Problem: Skin Injury Risk (Adult)  Goal: Identify Related Risk Factors and Signs and Symptoms  Outcome: Ongoing (interventions implemented as appropriate)   10/12/18 2006   Skin Injury Risk (Adult)   Related Risk Factors (Skin Injury Risk) mobility impaired  (regional anesthesia )     Goal: Skin Health and Integrity  Outcome: Ongoing (interventions implemented as appropriate)   10/12/18 2006   Skin Injury Risk (Adult)   Skin Health and Integrity achieves outcome

## 2018-10-13 NOTE — PROGRESS NOTES
[facility]  Vaginal Delivery Progress Note    Patient Name: Tita Mcghee  :  1985  MRN:  [id number]      Subjective   Postpartum Day 1: Vaginal Delivery of a female infant.     The patient feels well without complaints.  Her pain is well controlled.  Reports normal lochia.     The patient plans to breastfeed.    Objective     Vital Signs Range for the last 24 hours  Temperature: Temp:  [97 °F (36.1 °C)-98.3 °F (36.8 °C)] 97.7 °F (36.5 °C)       BP: BP: (0-167)/(0-111) 130/86   Pulse: Heart Rate:  [] 84   Respirations: Resp:  [16-18] 16                       Physical Exam:  General: Awake and alert  Abdomen: Fundus: firm, non tender  Extremities:  trace edema, NT     Labs:     Results from last 7 days  Lab Units 10/13/18  0522 10/11/18  1150   WBC 10*3/mm3 11.33* 9.42   HEMOGLOBIN g/dL 11.4* 11.0*   HEMATOCRIT % 36.5 36.4   PLATELETS 10*3/mm3 164 189         Rh Status:    RH type   Date Value Ref Range Status   10/11/2018 Positive  Final         Assessment/Plan  : 1. PPD1 S/P  - Doing well, continue usual care.   2. PIH - BP still labile. Started Procardia XL 30    Mg last PM. Cont to obs.  3. GDM A2. BS normal.            Elevated blood pressure affecting pregnancy in third trimester, antepartum    Elevated blood pressure complicating pregnancy in third trimester, antepartum          Daisy Jean MD  10/13/2018  11:04 AM

## 2018-10-14 VITALS
WEIGHT: 239 LBS | RESPIRATION RATE: 16 BRPM | TEMPERATURE: 98 F | HEIGHT: 67 IN | HEART RATE: 80 BPM | DIASTOLIC BLOOD PRESSURE: 77 MMHG | BODY MASS INDEX: 37.51 KG/M2 | OXYGEN SATURATION: 99 % | SYSTOLIC BLOOD PRESSURE: 115 MMHG

## 2018-10-14 PROBLEM — O16.3 ELEVATED BLOOD PRESSURE COMPLICATING PREGNANCY IN THIRD TRIMESTER, ANTEPARTUM: Status: RESOLVED | Noted: 2018-10-11 | Resolved: 2018-10-14

## 2018-10-14 PROBLEM — O16.3 ELEVATED BLOOD PRESSURE AFFECTING PREGNANCY IN THIRD TRIMESTER, ANTEPARTUM: Status: RESOLVED | Noted: 2018-10-11 | Resolved: 2018-10-14

## 2018-10-14 RX ORDER — NIFEDIPINE 30 MG/1
30 TABLET, FILM COATED, EXTENDED RELEASE ORAL
Qty: 30 TABLET | Refills: 1 | Status: SHIPPED | OUTPATIENT
Start: 2018-10-14

## 2018-10-14 RX ORDER — IBUPROFEN 800 MG/1
800 TABLET ORAL EVERY 8 HOURS PRN
Qty: 50 TABLET | Refills: 3 | Status: SHIPPED | OUTPATIENT
Start: 2018-10-14

## 2018-10-14 RX ORDER — OXYCODONE HYDROCHLORIDE AND ACETAMINOPHEN 5; 325 MG/1; MG/1
1 TABLET ORAL EVERY 4 HOURS PRN
Qty: 30 TABLET | Refills: 0 | Status: SHIPPED | OUTPATIENT
Start: 2018-10-14 | End: 2018-10-22

## 2018-10-14 RX ADMIN — OXYCODONE HYDROCHLORIDE AND ACETAMINOPHEN 1 TABLET: 5; 325 TABLET ORAL at 09:26

## 2018-10-14 RX ADMIN — IBUPROFEN 800 MG: 800 TABLET ORAL at 09:26

## 2018-10-14 RX ADMIN — OXYCODONE HYDROCHLORIDE AND ACETAMINOPHEN 1 TABLET: 5; 325 TABLET ORAL at 04:25

## 2018-10-14 NOTE — NURSING NOTE
Pt will be boarding in room, infant to stay admitted on phototherapy. D/c with prescriptions, gave hard copy Percocet script. Went over d/c education. Boarder room education sheet/ liability waiver signed, copy given to patient and signed placed in chart.

## 2018-10-14 NOTE — DISCHARGE SUMMARY
Date of Discharge:  10/14/2018    Discharge Diagnosis: Preeclampsia at 37 weeks, delivered    Presenting Problem/History of Present Illness  Term pregnancy [Z34.80]  Elevated blood pressure complicating pregnancy in third trimester, antepartum [O16.3]       Hospital Course  Patient is a 33 y.o. female presented with from office for evaluation with preeclampsia.  Observed overnight with persistently increased BP so was induced.  Had vaginal delivery and persistent labile BP so was started on Procardia.  Doing well now.  Had GDM but BS good so insulin discontinued.      Procedures Performed  Vaginal delivery       Consults:   Consults     No orders found from 2018 to 10/12/2018.          Condition on Discharge:   Subjective   Postpartum Day 2 Vaginal Delivery.    The patient feels well without complaints.    Vital Signs  Temp:  [97.4 °F (36.3 °C)-98.3 °F (36.8 °C)] 98 °F (36.7 °C)  Heart Rate:  [] 80  Resp:  [14-18] 16  BP: (112-159)/() 115/77    Physical Exam:   General: Awake and alert   Abdomen: Fundus: firm, non tender    Extremities:  Calves NT bilaterally    Assessment/Plan     PPD2  S/P  -   Stable for discharge. Instructions reviewed      Discharge Disposition  Home or Self Carehome    Discharge Medications     Discharge Medications      New Medications      Instructions Start Date   ibuprofen 800 MG tablet  Commonly known as:  ADVIL,MOTRIN   800 mg, Oral, Every 8 Hours PRN      NIFEdipine CC 30 MG 24 hr tablet  Commonly known as:  ADALAT CC   30 mg, Oral, Every 24 Hours Scheduled      oxyCODONE-acetaminophen 5-325 MG per tablet  Commonly known as:  PERCOCET   1 tablet, Oral, Every 4 Hours PRN         Continue These Medications      Instructions Start Date   acetaminophen 500 MG tablet  Commonly known as:  TYLENOL   1,000 mg, Oral, Every 6 Hours PRN      prenatal (CLASSIC) vitamin 28-0.8 MG tablet tablet   1 tablet, Oral, Daily      pseudoephedrine 30 MG tablet  Commonly known as:   SUDAFED   60 mg, Oral, Every 4 Hours PRN         Stop These Medications    insulin detemir 100 UNIT/ML injection  Commonly known as:  LEVEMIR     insulin lispro 100 UNIT/ML injection  Commonly known as:  humaLOG              The patient has been prescribed a controlled substance.  She has been counseled on the risks associated with using the medication.   The addictive potential of this medication and alternatives were discussed carefully with this patient and she demonstrated understanding.  A GUERRERO report has been obtained and reviewed.       Activity at Discharge:   Vag rest  Follow-up Appointments  2 weeks      Test Results Pending at Discharge  none     Ruthie Dowling MD  10/14/18  9:16 AM

## 2022-02-23 ENCOUNTER — APPOINTMENT (OUTPATIENT)
Dept: GENERAL RADIOLOGY | Facility: HOSPITAL | Age: 37
End: 2022-02-23

## 2022-02-23 ENCOUNTER — HOSPITAL ENCOUNTER (EMERGENCY)
Facility: HOSPITAL | Age: 37
Discharge: HOME | End: 2022-02-24
Attending: EMERGENCY MEDICINE | Admitting: EMERGENCY MEDICINE

## 2022-02-23 VITALS
DIASTOLIC BLOOD PRESSURE: 73 MMHG | BODY MASS INDEX: 36.34 KG/M2 | SYSTOLIC BLOOD PRESSURE: 124 MMHG | OXYGEN SATURATION: 96 % | TEMPERATURE: 99.4 F | HEART RATE: 97 BPM | HEIGHT: 68 IN | RESPIRATION RATE: 15 BRPM

## 2022-02-23 DIAGNOSIS — R51.9 ACUTE NONINTRACTABLE HEADACHE, UNSPECIFIED HEADACHE TYPE: ICD-10-CM

## 2022-02-23 DIAGNOSIS — M54.50 ACUTE MIDLINE LOW BACK PAIN WITHOUT SCIATICA: ICD-10-CM

## 2022-02-23 DIAGNOSIS — U07.1 COVID-19: Primary | ICD-10-CM

## 2022-02-23 LAB
ALBUMIN SERPL-MCNC: 4.5 G/DL (ref 3.5–5.2)
ALBUMIN/GLOB SERPL: 1.5 G/DL
ALP SERPL-CCNC: 74 U/L (ref 39–117)
ALT SERPL W P-5'-P-CCNC: 14 U/L (ref 1–33)
ANION GAP SERPL CALCULATED.3IONS-SCNC: 11 MMOL/L (ref 5–15)
AST SERPL-CCNC: 13 U/L (ref 1–32)
BACTERIA UR QL AUTO: ABNORMAL /HPF
BASOPHILS # BLD AUTO: 0.06 10*3/MM3 (ref 0–0.2)
BASOPHILS NFR BLD AUTO: 0.4 % (ref 0–1.5)
BILIRUB SERPL-MCNC: 0.6 MG/DL (ref 0–1.2)
BILIRUB UR QL STRIP: NEGATIVE
BUN SERPL-MCNC: 13 MG/DL (ref 6–20)
BUN/CREAT SERPL: 17.1 (ref 7–25)
CALCIUM SPEC-SCNC: 9.2 MG/DL (ref 8.6–10.5)
CHLORIDE SERPL-SCNC: 104 MMOL/L (ref 98–107)
CLARITY UR: CLEAR
CO2 SERPL-SCNC: 24 MMOL/L (ref 22–29)
COLOR UR: YELLOW
CREAT SERPL-MCNC: 0.76 MG/DL (ref 0.57–1)
D-LACTATE SERPL-SCNC: 1.2 MMOL/L (ref 0.5–2)
DEPRECATED RDW RBC AUTO: 41.1 FL (ref 37–54)
EOSINOPHIL # BLD AUTO: 0.07 10*3/MM3 (ref 0–0.4)
EOSINOPHIL NFR BLD AUTO: 0.5 % (ref 0.3–6.2)
ERYTHROCYTE [DISTWIDTH] IN BLOOD BY AUTOMATED COUNT: 13.2 % (ref 12.3–15.4)
GFR SERPL CREATININE-BSD FRML MDRD: 87 ML/MIN/1.73
GLOBULIN UR ELPH-MCNC: 3.1 GM/DL
GLUCOSE SERPL-MCNC: 120 MG/DL (ref 65–99)
GLUCOSE UR STRIP-MCNC: NEGATIVE MG/DL
HCT VFR BLD AUTO: 43 % (ref 34–46.6)
HGB BLD-MCNC: 14.1 G/DL (ref 12–15.9)
HGB UR QL STRIP.AUTO: NEGATIVE
HYALINE CASTS UR QL AUTO: ABNORMAL /LPF
IMM GRANULOCYTES # BLD AUTO: 0.08 10*3/MM3 (ref 0–0.05)
IMM GRANULOCYTES NFR BLD AUTO: 0.6 % (ref 0–0.5)
KETONES UR QL STRIP: NEGATIVE
LEUKOCYTE ESTERASE UR QL STRIP.AUTO: ABNORMAL
LYMPHOCYTES # BLD AUTO: 1.7 10*3/MM3 (ref 0.7–3.1)
LYMPHOCYTES NFR BLD AUTO: 12.3 % (ref 19.6–45.3)
MCH RBC QN AUTO: 28.5 PG (ref 26.6–33)
MCHC RBC AUTO-ENTMCNC: 32.8 G/DL (ref 31.5–35.7)
MCV RBC AUTO: 86.9 FL (ref 79–97)
MONOCYTES # BLD AUTO: 0.64 10*3/MM3 (ref 0.1–0.9)
MONOCYTES NFR BLD AUTO: 4.6 % (ref 5–12)
NEUTROPHILS NFR BLD AUTO: 11.23 10*3/MM3 (ref 1.7–7)
NEUTROPHILS NFR BLD AUTO: 81.6 % (ref 42.7–76)
NITRITE UR QL STRIP: NEGATIVE
NRBC BLD AUTO-RTO: 0 /100 WBC (ref 0–0.2)
PH UR STRIP.AUTO: 6 [PH] (ref 5–8)
PLATELET # BLD AUTO: 266 10*3/MM3 (ref 140–450)
PMV BLD AUTO: 10.7 FL (ref 6–12)
POTASSIUM SERPL-SCNC: 4.2 MMOL/L (ref 3.5–5.2)
PROCALCITONIN SERPL-MCNC: 0.05 NG/ML (ref 0–0.25)
PROT SERPL-MCNC: 7.6 G/DL (ref 6–8.5)
PROT UR QL STRIP: NEGATIVE
RBC # BLD AUTO: 4.95 10*6/MM3 (ref 3.77–5.28)
RBC # UR STRIP: ABNORMAL /HPF
REF LAB TEST METHOD: ABNORMAL
SARS-COV-2 RNA PNL SPEC NAA+PROBE: DETECTED
SODIUM SERPL-SCNC: 139 MMOL/L (ref 136–145)
SP GR UR STRIP: 1.02 (ref 1–1.03)
SQUAMOUS #/AREA URNS HPF: ABNORMAL /HPF
UROBILINOGEN UR QL STRIP: ABNORMAL
WBC # UR STRIP: ABNORMAL /HPF
WBC NRBC COR # BLD: 13.78 10*3/MM3 (ref 3.4–10.8)

## 2022-02-23 PROCEDURE — 25010000002 HYDROMORPHONE PER 4 MG: Performed by: EMERGENCY MEDICINE

## 2022-02-23 PROCEDURE — 87635 SARS-COV-2 COVID-19 AMP PRB: CPT | Performed by: EMERGENCY MEDICINE

## 2022-02-23 PROCEDURE — 25010000002 DIPHENHYDRAMINE PER 50 MG: Performed by: EMERGENCY MEDICINE

## 2022-02-23 PROCEDURE — 71045 X-RAY EXAM CHEST 1 VIEW: CPT

## 2022-02-23 PROCEDURE — 84145 PROCALCITONIN (PCT): CPT | Performed by: EMERGENCY MEDICINE

## 2022-02-23 PROCEDURE — 96375 TX/PRO/DX INJ NEW DRUG ADDON: CPT

## 2022-02-23 PROCEDURE — 85025 COMPLETE CBC W/AUTO DIFF WBC: CPT | Performed by: EMERGENCY MEDICINE

## 2022-02-23 PROCEDURE — 80053 COMPREHEN METABOLIC PANEL: CPT | Performed by: EMERGENCY MEDICINE

## 2022-02-23 PROCEDURE — 81001 URINALYSIS AUTO W/SCOPE: CPT | Performed by: EMERGENCY MEDICINE

## 2022-02-23 PROCEDURE — 25010000002 KETOROLAC TROMETHAMINE PER 15 MG: Performed by: EMERGENCY MEDICINE

## 2022-02-23 PROCEDURE — 99283 EMERGENCY DEPT VISIT LOW MDM: CPT

## 2022-02-23 PROCEDURE — 96374 THER/PROPH/DIAG INJ IV PUSH: CPT

## 2022-02-23 PROCEDURE — 83605 ASSAY OF LACTIC ACID: CPT | Performed by: EMERGENCY MEDICINE

## 2022-02-23 PROCEDURE — 25010000002 PROCHLORPERAZINE 10 MG/2ML SOLUTION: Performed by: EMERGENCY MEDICINE

## 2022-02-23 RX ORDER — SODIUM CHLORIDE 0.9 % (FLUSH) 0.9 %
10 SYRINGE (ML) INJECTION AS NEEDED
Status: DISCONTINUED | OUTPATIENT
Start: 2022-02-23 | End: 2022-02-24 | Stop reason: HOSPADM

## 2022-02-23 RX ORDER — DIPHENHYDRAMINE HYDROCHLORIDE 50 MG/ML
25 INJECTION INTRAMUSCULAR; INTRAVENOUS ONCE
Status: COMPLETED | OUTPATIENT
Start: 2022-02-23 | End: 2022-02-23

## 2022-02-23 RX ORDER — LIDOCAINE HYDROCHLORIDE 10 MG/ML
10 INJECTION, SOLUTION INFILTRATION; PERINEURAL ONCE
Status: DISCONTINUED | OUTPATIENT
Start: 2022-02-23 | End: 2022-02-23

## 2022-02-23 RX ORDER — PROCHLORPERAZINE EDISYLATE 5 MG/ML
5 INJECTION INTRAMUSCULAR; INTRAVENOUS ONCE
Status: COMPLETED | OUTPATIENT
Start: 2022-02-23 | End: 2022-02-23

## 2022-02-23 RX ORDER — HYDROMORPHONE HYDROCHLORIDE 1 MG/ML
0.5 INJECTION, SOLUTION INTRAMUSCULAR; INTRAVENOUS; SUBCUTANEOUS ONCE
Status: COMPLETED | OUTPATIENT
Start: 2022-02-23 | End: 2022-02-23

## 2022-02-23 RX ORDER — KETOROLAC TROMETHAMINE 15 MG/ML
15 INJECTION, SOLUTION INTRAMUSCULAR; INTRAVENOUS ONCE
Status: COMPLETED | OUTPATIENT
Start: 2022-02-23 | End: 2022-02-23

## 2022-02-23 RX ADMIN — SODIUM CHLORIDE 1000 ML: 9 INJECTION, SOLUTION INTRAVENOUS at 21:15

## 2022-02-23 RX ADMIN — DIPHENHYDRAMINE HYDROCHLORIDE 25 MG: 50 INJECTION, SOLUTION INTRAMUSCULAR; INTRAVENOUS at 21:12

## 2022-02-23 RX ADMIN — HYDROMORPHONE HYDROCHLORIDE 0.5 MG: 1 INJECTION, SOLUTION INTRAMUSCULAR; INTRAVENOUS; SUBCUTANEOUS at 22:22

## 2022-02-23 RX ADMIN — KETOROLAC TROMETHAMINE 15 MG: 15 INJECTION, SOLUTION INTRAMUSCULAR; INTRAVENOUS at 21:12

## 2022-02-23 RX ADMIN — PROCHLORPERAZINE EDISYLATE 5 MG: 5 INJECTION INTRAMUSCULAR; INTRAVENOUS at 21:12
